# Patient Record
Sex: FEMALE | Race: WHITE
[De-identification: names, ages, dates, MRNs, and addresses within clinical notes are randomized per-mention and may not be internally consistent; named-entity substitution may affect disease eponyms.]

---

## 2018-09-20 ENCOUNTER — HOSPITAL ENCOUNTER (EMERGENCY)
Dept: HOSPITAL 62 - ER | Age: 63
LOS: 1 days | Discharge: HOME | End: 2018-09-21
Payer: MEDICARE

## 2018-09-20 DIAGNOSIS — R00.2: Primary | ICD-10-CM

## 2018-09-20 DIAGNOSIS — R07.9: ICD-10-CM

## 2018-09-20 DIAGNOSIS — R19.7: ICD-10-CM

## 2018-09-20 LAB
ADD MANUAL DIFF: NO
ALBUMIN SERPL-MCNC: 3.7 G/DL (ref 3.5–5)
ALP SERPL-CCNC: 91 U/L (ref 38–126)
ALT SERPL-CCNC: 36 U/L (ref 9–52)
ANION GAP SERPL CALC-SCNC: 9 MMOL/L (ref 5–19)
AST SERPL-CCNC: 26 U/L (ref 14–36)
BASOPHILS # BLD AUTO: 0.1 10^3/UL (ref 0–0.2)
BASOPHILS NFR BLD AUTO: 1.1 % (ref 0–2)
BILIRUB DIRECT SERPL-MCNC: 0.4 MG/DL (ref 0–0.4)
BILIRUB SERPL-MCNC: 0.5 MG/DL (ref 0.2–1.3)
BUN SERPL-MCNC: 18 MG/DL (ref 7–20)
CALCIUM: 9.7 MG/DL (ref 8.4–10.2)
CHLORIDE SERPL-SCNC: 108 MMOL/L (ref 98–107)
CK SERPL-CCNC: 214 U/L (ref 30–135)
CO2 SERPL-SCNC: 22 MMOL/L (ref 22–30)
EOSINOPHIL # BLD AUTO: 0.2 10^3/UL (ref 0–0.6)
EOSINOPHIL NFR BLD AUTO: 3.2 % (ref 0–6)
ERYTHROCYTE [DISTWIDTH] IN BLOOD BY AUTOMATED COUNT: 13.6 % (ref 11.5–14)
GLUCOSE SERPL-MCNC: 99 MG/DL (ref 75–110)
HCT VFR BLD CALC: 42.6 % (ref 36–47)
HGB BLD-MCNC: 14.8 G/DL (ref 12–15.5)
LYMPHOCYTES # BLD AUTO: 2.5 10^3/UL (ref 0.5–4.7)
LYMPHOCYTES NFR BLD AUTO: 41.6 % (ref 13–45)
MCH RBC QN AUTO: 30.9 PG (ref 27–33.4)
MCHC RBC AUTO-ENTMCNC: 34.8 G/DL (ref 32–36)
MCV RBC AUTO: 89 FL (ref 80–97)
MONOCYTES # BLD AUTO: 0.5 10^3/UL (ref 0.1–1.4)
MONOCYTES NFR BLD AUTO: 8.5 % (ref 3–13)
NEUTROPHILS # BLD AUTO: 2.8 10^3/UL (ref 1.7–8.2)
NEUTS SEG NFR BLD AUTO: 45.6 % (ref 42–78)
PLATELET # BLD: 287 10^3/UL (ref 150–450)
POTASSIUM SERPL-SCNC: 4 MMOL/L (ref 3.6–5)
PROT SERPL-MCNC: 6.6 G/DL (ref 6.3–8.2)
RBC # BLD AUTO: 4.8 10^6/UL (ref 3.72–5.28)
SODIUM SERPL-SCNC: 138.5 MMOL/L (ref 137–145)
TOTAL CELLS COUNTED % (AUTO): 100 %
WBC # BLD AUTO: 6.1 10^3/UL (ref 4–10.5)

## 2018-09-20 PROCEDURE — 82550 ASSAY OF CK (CPK): CPT

## 2018-09-20 PROCEDURE — 80053 COMPREHEN METABOLIC PANEL: CPT

## 2018-09-20 PROCEDURE — 71045 X-RAY EXAM CHEST 1 VIEW: CPT

## 2018-09-20 PROCEDURE — 96360 HYDRATION IV INFUSION INIT: CPT

## 2018-09-20 PROCEDURE — 85025 COMPLETE CBC W/AUTO DIFF WBC: CPT

## 2018-09-20 PROCEDURE — 83735 ASSAY OF MAGNESIUM: CPT

## 2018-09-20 PROCEDURE — 93005 ELECTROCARDIOGRAM TRACING: CPT

## 2018-09-20 PROCEDURE — 93010 ELECTROCARDIOGRAM REPORT: CPT

## 2018-09-20 PROCEDURE — 82553 CREATINE MB FRACTION: CPT

## 2018-09-20 PROCEDURE — 84484 ASSAY OF TROPONIN QUANT: CPT

## 2018-09-20 PROCEDURE — 99285 EMERGENCY DEPT VISIT HI MDM: CPT

## 2018-09-20 PROCEDURE — 36415 COLL VENOUS BLD VENIPUNCTURE: CPT

## 2018-09-20 NOTE — ER DOCUMENT REPORT
ED Medical Screen (RME)





- General


Chief Complaint: Chest Pain


Stated Complaint: CHEST PAIN


Time Seen by Provider: 09/20/18 21:56


Notes: 





63-year-old female comes emergency department for chief complaint of discomfort 

in her chest and a sensation of her heart racing.  Symptoms started at 7 PM at 

rest.  She denies pain at this time but states she still feels like her heart 

is racing.  Denies any other symptoms at this time other than feeling very 

anxious.  Past medical history of atrial fibrillation. She is on Eliquis, 

Cardizem.   at bedside reports history of CVA but not MI.


TRAVEL OUTSIDE OF THE [place] IN LAST 30 DAYS: No





- Related Data


Allergies/Adverse Reactions: 


 





No Known Allergies Allergy (Verified 07/09/16 00:53)


 











Past Medical History





- Past Medical History


Cardiac Medical History: Reports: Hx Atrial Fibrillation, Hx 

Hypercholesterolemia


Psychiatric Medical History: Reports: Hx Depression


Past Surgical History: Reports: Hx Cholecystectomy, Hx Hysterectomy, Hx 

Orthopedic Surgery - R foot, L shoulder





Physical Exam





- Vital signs


Vitals: 





 











Temp Pulse Resp BP Pulse Ox


 


 97.6 F   104 H  20   120/87 H  100 


 


 09/20/18 21:49  09/20/18 21:49  09/20/18 21:49  09/20/18 21:49  09/20/18 21:49














- General


General appearance: Anxious


In distress: None





- Cardiovascular


Rhythm: Irregularly irregular, Tachycardia - Borderline


Heart sounds: Normal auscultation, S1 appreciated, S2 appreciated





Course





- Vital Signs


Vital signs: 





 











Temp Pulse Resp BP Pulse Ox


 


 97.6 F   104 H  20   120/87 H  100 


 


 09/20/18 21:49  09/20/18 21:49  09/20/18 21:49  09/20/18 21:49  09/20/18 21:49














Doctor's Discharge





- Discharge


Referrals: 


LEONA MAGANA MD [Primary Care Provider] - Follow up as needed
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
## 2018-09-21 VITALS — SYSTOLIC BLOOD PRESSURE: 112 MMHG | DIASTOLIC BLOOD PRESSURE: 89 MMHG

## 2018-09-21 LAB
CK MB SERPL-MCNC: 3.2 NG/ML (ref ?–4.55)
TROPONIN I SERPL-MCNC: 0.01 NG/ML

## 2018-09-21 NOTE — EKG REPORT
SEVERITY:- ABNORMAL ECG -

SINUS RHYTHM

PROBABLE POSTERIOR INFARCT

:

Confirmed by: Emperatriz Easley MD 21-Sep-2018 10:23:00

## 2018-09-21 NOTE — RADIOLOGY REPORT (SQ)
EXAM DESCRIPTION:



CLINICAL HISTORY:  63 years  Female  chest discomfort



COMPARISON:  10/31/2015



COMPLETED DATE/TME:  09/20/2018 22:03

.



FINDINGS: 



The cardiomediastinal silhouette appears unremarkable.

No consolidating infiltrates or pleural effusions.

No pneumothorax. 

Clip from the EKG overlying the chest.





IMPRESSION: 



No acute abnormality is identified.